# Patient Record
Sex: FEMALE | Race: OTHER | HISPANIC OR LATINO | Employment: UNEMPLOYED | URBAN - METROPOLITAN AREA
[De-identification: names, ages, dates, MRNs, and addresses within clinical notes are randomized per-mention and may not be internally consistent; named-entity substitution may affect disease eponyms.]

---

## 2018-02-19 ENCOUNTER — HOSPITAL ENCOUNTER (EMERGENCY)
Facility: HOSPITAL | Age: 53
Discharge: HOME | End: 2018-02-19
Payer: COMMERCIAL

## 2018-02-19 VITALS
HEART RATE: 112 BPM | TEMPERATURE: 98.7 F | OXYGEN SATURATION: 98 % | SYSTOLIC BLOOD PRESSURE: 159 MMHG | DIASTOLIC BLOOD PRESSURE: 76 MMHG | RESPIRATION RATE: 18 BRPM | WEIGHT: 248 LBS

## 2018-02-19 DIAGNOSIS — B34.9 ACUTE VIRAL SYNDROME: Primary | ICD-10-CM

## 2018-02-19 PROCEDURE — 99283 EMERGENCY DEPT VISIT LOW MDM: CPT

## 2018-02-19 NOTE — DISCHARGE INSTRUCTIONS
Síndrome viral   LO QUE NECESITA SABER:   El síndrome viral es un término general usado para describir mushtaq infección viral que no tiene mushtaq causa definida  Los virus son propagados fácilmente de Ismael Roberto persona a otra a través del aire y [place] los objetos que se comparten  INSTRUCCIONES SOBRE [facility]:   Llame al 911 en mireille de presentar lo siguiente:   · Usted sufre mushtaq convulsión  · No es posible despertarlo  · Usted tiene dolor torácico y dificultad para respirar  Regrese a la mary de emergencias si:   · Usted tiene rigidez en el collin, dolor de ofelia intenso y sensibilidad a la emilee  · Usted se siente débil, mareado o confundido  · Usted demond de orinar u orina menos de lo normal      · Usted tose emmy o mushtaq mucosidad espesa amarilla o ta  · Usted tiene dolor abdominal severo o horne abdomen está más jaguar de lo habitual   Pregúntele a horne médico qué vitaminas y minerales son adecuados para usted  · Suzy síntomas no mejoran con el tratamiento o empeoran después de 3 días  · Usted tiene salpullido o dolor de oído  · Usted siente ardor al [place]  · Usted tiene preguntas o inquietudes acerca de horne condición o cuidado  Medicamentos:  Es posible que usted necesite alguno de los siguientes:  · El acetaminofén  armaan el dolor y baja la fiebre  Está disponible sin receta médica  Pregunte cuánto medicamento debe zen y con qué frecuencia  [id number]  El acetaminofén puede causar daño en el hígado cuando no se sae de forma correcta  · AINEs (Analgésicos antiinflamatorios no esteroides) keith el ibuprofeno, ayudan a disminuir la inflamación, el dolor y la [place]  Los AINEs pueden causar sangrado estomacal o problemas renales en ciertas personas  Si usted sae un medicamento anticoagulante, siempre pregúntele a horne médico si los HARSH son seguros para usted  Siempre sachi la etiqueta de gualberto medicamento y [place] instrucciones      · El medicamento para el resfriado  ayuda a disminuir la inflamación, a controlar la tos y a aliviar la congestión del pecho o nasal      · El rociador nasal de agua salina  ayuda a disminuir la congestión nasal      · Josephine alanna medicamentos keith se le haya indicado  Consulte con horne médico si usted parker que horne medicamento no le está ayudando o si presenta efectos secundarios  Infórmele si es alérgico a algún medicamento  Mantenga mushtaq lista actualizada de los [place], las vitaminas y los productos herbales que sae  Incluya los siguientes datos de los medicamentos: cantidad, frecuencia y motivo de administración  Traiga con usted la lista o los envases de la píldoras a alanna citas de seguimiento  Lleve la lista de los medicamentos con usted en mireille de mushtaq emergencia  El manejo de horne síntomas:   · Consuma líquidos según le indicaron  para evitar la deshidratación  Pregunte cuánto líquido debe zen cada día y cuáles líquidos son los más adecuados para usted  Pregunte si usted debería zen mushtaq solución de rehidratación oral (SRO)  [address] cantidades exactas de agua, sal y azúcar que usted necesita para reemplazar los líquidos corporales  [place] podría ayudarlo a evitar la deshidratación a causa del vómito y de la diarrea  No tome líquidos con cafeína  Los líquidos con cafeína pueden empeorar la deshidratación  · Descanse lo suficiente  para ayudar a que horne cuerpo sane  Josephine siestas karen el día  Pregunte a horne médico cuándo puede regresar a horne trabajo y a alanna actividades cotidianas  · Use un humidificador de laurita frío  para ayudarlo a respirar más fácilmente si usted tiene congestión nasal o del pecho  Pregunte a horne médico cómo usar un humidificador de vapor frío  · Coma miel o use caramelos para la tos  para ayudar a disminuir la molestia de la garganta  Pregunte a horne médico cuánta miel debería comer cada día  Los caramelos para la tos están disponibles sin receta médica   Siga las indicaciones para zen los caramelos para la tos  · No fume y permanezca lejos de otras personas que fuman  La nicotina y otras sustancias químicas que contienen los cigarrillos y cigarros pueden dañar los pulmones  El fumar también puede retrasar la sanación  Pida información a quiles médico si usted actualmente fuma y necesita ayuda para dejar de fumar  Los cigarrillos electrónicos o tabaco sin humo todavía contienen nicotina  Consulte con quiles médico antes de [employer]  · Lávese las cherelle frecuentemente  para evitar la propagación de gérmenes a otras personas  Utilice agua y Jovani  Use gel antibacterial cuando no tenga jabón ni agua disponibles  [employer] las cherelle después de usar el baño, toser o estornudar  Lávese las cherelle antes de comer o preparar alimentos  Acuda a alanna consultas de control con quiles médico según le indicaron  Anote alanna preguntas para que se acuerde de hacerlas karen alanna visitas  © 2017 [address]  Information is for End User's use only and may not be sold, redistributed or otherwise used for commercial purposes  All illustrations and images included in CareNotes® are the copyrighted property of A D A M , Inc  or Jonatan Frey  Esta información es sólo para uso en educación  Quiles intención no es darle un consejo médico sobre enfermedades o tratamientos  Colsulte con quiles Harjinder Handy farmacéutico antes de seguir cualquier régimen médico para saber si es seguro y efectivo para usted

## 2018-02-19 NOTE — ED PROVIDER NOTES
History  Chief Complaint   Patient presents with    Flu Symptoms     c/o chills, subjective feves  sore throat and cough, that started last wednesday  pt came from [place] on tuesday   +sick contacts  47year old female PMH asthma, HTN, DM, psychiatric illness presents today complaining of nonproductive cough, congestion, sore throat, subjective fever, body aches which started a few days ago  Patient recently traveled to the [address]  from [facility]   Denies chest pain, shortness of breath, leg swelling, abdominal pain, nausea, vomiting, diarrhea, dysuria  She has been tolerating p  o   Has been taking Tylenol and an over-the-counter cough medication which has been helping with her symptoms  Her family is here today with same symptoms  None       Past Medical History:   Diagnosis Date    Asthma     Diabetes mellitus ([address] )     GERD (gastroesophageal reflux disease)     Hyperlipidemia     Hypertension     Psychiatric disorder     schizophrenia        Past Surgical History:   Procedure Laterality Date    THROAT SURGERY      8 years ago        History reviewed  No pertinent family history  I have reviewed and agree with the history as documented  Social History   Substance Use Topics    Smoking status: Current Every Day Smoker     Packs/day: 0 20    Smokeless tobacco: Never Used    Alcohol use No        Review of Systems   Constitutional: Positive for chills  HENT: Positive for congestion and sore throat  Respiratory: Positive for cough  Musculoskeletal: Positive for myalgias  All other systems reviewed and are negative        Physical Exam  ED Triage Vitals [02/19/18 1604]   Temperature Pulse Respirations Blood Pressure SpO2   98 7 °F (37 1 °C) (!) 112 18 159/76 98 %      Temp Source Heart Rate Source Patient Position - Orthostatic VS BP Location FiO2 (%)   Oral Monitor Sitting Right arm --      Pain Score       9           Orthostatic Vital Signs  Vitals:    02/19/18 1604 BP: 159/76   Pulse: (!) 112   Patient Position - Orthostatic VS: Sitting       Physical Exam   Constitutional: She appears well-developed and well-nourished  No distress  HENT:   Head: Normocephalic and atraumatic  Mouth/Throat: Oropharynx is clear and moist  No oropharyngeal exudate  Eyes: Conjunctivae and EOM are normal  Pupils are equal, round, and reactive to light  Neck: Normal range of motion  Cardiovascular: Normal rate and regular rhythm  Pulmonary/Chest: Effort normal and breath sounds normal  No respiratory distress  She has no wheezes  Abdominal: Soft  She exhibits no distension  There is no tenderness  There is no guarding  Musculoskeletal: Normal range of motion  Lymphadenopathy:     She has no cervical adenopathy  Neurological: She is alert  Skin: Skin is warm and dry  Capillary refill takes less than 2 seconds  She is not diaphoretic  Psychiatric: She has a normal mood and affect  ED Medications  Medications - No data to display    Diagnostic Studies  Results Reviewed     None                 No orders to display              Procedures  Procedures       Phone Contacts  ED Phone Contact    ED Course  ED Course                                MDM  Number of Diagnoses or Management Options  Acute viral syndrome:   Diagnosis management comments: Pt with flu-like symptoms  Entire family sick with same  Recent travel, pt denies SOB, hemoptysis or history of DVT/PE  Out of time frame for Tamiflu  Will recommend supportive care and follow-up with PCP  Return precautions reviewed      CritCare Time    Disposition  Final diagnoses:   Acute viral syndrome     Time reflects when diagnosis was documented in both MDM as applicable and the Disposition within this note     Time User Action Codes Description Comment    2/19/2018  6:25 PM Moris Overton Add [B34 9] Acute viral syndrome       ED Disposition     ED Disposition Condition Comment    Discharge  [facility] discharge to home/self care  Condition at discharge: Good        Follow-up Information     Follow up With Specialties Details Why [address]  Schedule an appointment as soon as possible for a visit  [address] [facility] [address] [place] [address] [facility]          There are no discharge medications for this patient  No discharge procedures on file      ED Provider  Electronically Signed by           Viktoria Mccall PA-C  02/19/18 209 [facility] Hameed  02/19/18 8889

## 2018-04-02 ENCOUNTER — HOSPITAL ENCOUNTER (EMERGENCY)
Facility: HOSPITAL | Age: 53
Discharge: HOME | End: 2018-04-03
Attending: EMERGENCY MEDICINE | Admitting: EMERGENCY MEDICINE
Payer: COMMERCIAL

## 2018-04-02 DIAGNOSIS — K80.20 CHOLELITHIASIS: Primary | ICD-10-CM

## 2018-04-02 DIAGNOSIS — E27.8 ADRENAL NODULE (HCC): ICD-10-CM

## 2018-04-02 LAB
BASOPHILS # BLD AUTO: 0.05 THOUSANDS/ΜL (ref 0–0.1)
BASOPHILS NFR BLD AUTO: 0 % (ref 0–1)
EOSINOPHIL # BLD AUTO: 0.19 THOUSAND/ΜL (ref 0–0.61)
EOSINOPHIL NFR BLD AUTO: 2 % (ref 0–6)
ERYTHROCYTE [DISTWIDTH] IN BLOOD BY AUTOMATED COUNT: 12.5 % (ref 11.6–15.1)
HCT VFR BLD AUTO: 42 % (ref 34.8–46.1)
HGB BLD-MCNC: 14 G/DL (ref 11.5–15.4)
LYMPHOCYTES # BLD AUTO: 3.12 THOUSANDS/ΜL (ref 0.6–4.47)
LYMPHOCYTES NFR BLD AUTO: 26 % (ref 14–44)
MCH RBC QN AUTO: 30.7 PG (ref 26.8–34.3)
MCHC RBC AUTO-ENTMCNC: 33.3 G/DL (ref 31.4–37.4)
MCV RBC AUTO: 92 FL (ref 82–98)
MONOCYTES # BLD AUTO: 0.61 THOUSAND/ΜL (ref 0.17–1.22)
MONOCYTES NFR BLD AUTO: 5 % (ref 4–12)
NEUTROPHILS # BLD AUTO: 8.02 THOUSANDS/ΜL (ref 1.85–7.62)
NEUTS SEG NFR BLD AUTO: 67 % (ref 43–75)
NRBC BLD AUTO-RTO: 0 /100 WBCS
PLATELET # BLD AUTO: 316 THOUSANDS/UL (ref 149–390)
PMV BLD AUTO: 10.6 FL (ref 8.9–12.7)
RBC # BLD AUTO: 4.56 MILLION/UL (ref 3.81–5.12)
WBC # BLD AUTO: 11.99 THOUSAND/UL (ref 4.31–10.16)

## 2018-04-02 PROCEDURE — 80053 COMPREHEN METABOLIC PANEL: CPT | Performed by: EMERGENCY MEDICINE

## 2018-04-02 PROCEDURE — 81002 URINALYSIS NONAUTO W/O SCOPE: CPT | Performed by: EMERGENCY MEDICINE

## 2018-04-02 PROCEDURE — 83690 ASSAY OF LIPASE: CPT | Performed by: EMERGENCY MEDICINE

## 2018-04-02 PROCEDURE — 85025 COMPLETE CBC W/AUTO DIFF WBC: CPT | Performed by: EMERGENCY MEDICINE

## 2018-04-02 PROCEDURE — 36415 COLL VENOUS BLD VENIPUNCTURE: CPT | Performed by: EMERGENCY MEDICINE

## 2018-04-03 ENCOUNTER — APPOINTMENT (EMERGENCY)
Dept: CT IMAGING | Facility: HOSPITAL | Age: 53
End: 2018-04-03
Payer: COMMERCIAL

## 2018-04-03 VITALS
OXYGEN SATURATION: 97 % | SYSTOLIC BLOOD PRESSURE: 188 MMHG | HEART RATE: 90 BPM | TEMPERATURE: 97.8 F | RESPIRATION RATE: 20 BRPM | DIASTOLIC BLOOD PRESSURE: 88 MMHG

## 2018-04-03 LAB
ALBUMIN SERPL BCP-MCNC: 3.4 G/DL (ref 3.5–5)
ALP SERPL-CCNC: 136 U/L (ref 46–116)
ALT SERPL W P-5'-P-CCNC: 34 U/L (ref 12–78)
ANION GAP SERPL CALCULATED.3IONS-SCNC: 13 MMOL/L (ref 4–13)
AST SERPL W P-5'-P-CCNC: 20 U/L (ref 5–45)
BACTERIA UR QL AUTO: ABNORMAL /HPF
BILIRUB SERPL-MCNC: 0.22 MG/DL (ref 0.2–1)
BILIRUB UR QL STRIP: NEGATIVE
BUN SERPL-MCNC: 14 MG/DL (ref 5–25)
CALCIUM SERPL-MCNC: 9.1 MG/DL (ref 8.3–10.1)
CHLORIDE SERPL-SCNC: 100 MMOL/L (ref 100–108)
CLARITY UR: CLEAR
CO2 SERPL-SCNC: 26 MMOL/L (ref 21–32)
COLOR UR: YELLOW
CREAT SERPL-MCNC: 0.85 MG/DL (ref 0.6–1.3)
GFR SERPL CREATININE-BSD FRML MDRD: 79 ML/MIN/1.73SQ M
GLUCOSE SERPL-MCNC: 322 MG/DL (ref 65–140)
GLUCOSE UR STRIP-MCNC: ABNORMAL MG/DL
HGB UR QL STRIP.AUTO: NEGATIVE
KETONES UR STRIP-MCNC: ABNORMAL MG/DL
LEUKOCYTE ESTERASE UR QL STRIP: ABNORMAL
LIPASE SERPL-CCNC: 120 U/L (ref 73–393)
NITRITE UR QL STRIP: NEGATIVE
NON-SQ EPI CELLS URNS QL MICRO: ABNORMAL /HPF
PH UR STRIP.AUTO: 5.5 [PH] (ref 4.5–8)
POTASSIUM SERPL-SCNC: 3.9 MMOL/L (ref 3.5–5.3)
PROT SERPL-MCNC: 6.8 G/DL (ref 6.4–8.2)
PROT UR STRIP-MCNC: NEGATIVE MG/DL
RBC #/AREA URNS AUTO: ABNORMAL /HPF
SODIUM SERPL-SCNC: 139 MMOL/L (ref 136–145)
SP GR UR STRIP.AUTO: 1.02 (ref 1–1.03)
UROBILINOGEN UR QL STRIP.AUTO: 0.2 E.U./DL
WBC #/AREA URNS AUTO: ABNORMAL /HPF

## 2018-04-03 PROCEDURE — 96360 HYDRATION IV INFUSION INIT: CPT

## 2018-04-03 PROCEDURE — 99284 EMERGENCY DEPT VISIT MOD MDM: CPT

## 2018-04-03 PROCEDURE — 81001 URINALYSIS AUTO W/SCOPE: CPT

## 2018-04-03 PROCEDURE — 74177 CT ABD & PELVIS W/CONTRAST: CPT

## 2018-04-03 RX ORDER — NAPROXEN 500 MG/1
500 TABLET ORAL 2 TIMES DAILY WITH MEALS
Qty: 10 TABLET | Refills: 0 | Status: SHIPPED | OUTPATIENT
Start: 2018-04-03 | End: 2018-04-08

## 2018-04-03 RX ADMIN — SODIUM CHLORIDE 1000 ML: 0.9 INJECTION, SOLUTION INTRAVENOUS at 00:24

## 2018-04-03 RX ADMIN — IOHEXOL 100 ML: 350 INJECTION, SOLUTION INTRAVENOUS at 00:38

## 2018-04-03 NOTE — DISCHARGE INSTRUCTIONS
Gallstones   WHAT YOU NEED TO KNOW:   What are gallstones? Gallstones, also called cholelithiasis, are hard substances that form in your gallbladder or bile duct  Your gallbladder and bile duct are located on the right side of your abdomen, near your liver  Your gallbladder stores bile, which helps break down the fat that you eat  Your gallbladder also helps remove certain chemicals from your body  What causes gallstones? Gallstones develop when your gallbladder does not empty correctly  Stones can form from different bile materials  The following may increase your risk:  · Obesity    · Pregnancy    · Having a family member with gallstones    · Diabetes or previous surgery of the intestines    · Rapid weight loss    · Certain medicines, such as estrogen, antibiotics, and cholesterol-lowering medicines  What are the signs and symptoms of gallstones? The most common symptom is severe, constant pain in the right upper abdomen  It is usually just below the ribcage  The pain may also be felt in the right shoulder and between the shoulder blades  You may also have any of the following:  · Nausea and vomiting    · Feeling bloated    · Pale bowel movements    · Dark urine  How are gallstones diagnosed? · Blood tests  may show signs of infection or inflammation  · An abdominal ultrasound  uses sound waves to show pictures of your abdomen on a monitor  · A liver and gallbladder scan  may also be called a HIDA scan  You are given a small amount of radioactive dye in your IV and pictures are taken by a scanner  Your healthcare provider looks at the pictures to see if your liver and gallbladder are working normally  · An ERCP  is also called an endoscopic retrograde cholangiopancreatography  This test is done during an endoscopy to find stones, tumors, or other problems  Dye is put into the endoscopy tube  The dye helps your pancreas and bile ducts show up better on x-rays   Tell the healthcare provider if you have ever had an allergic reaction to contrast dye  If you have stones, they may be removed during ERCP  · Oral cholecystography  is a test to look at your gallbladder and its ducts (passages)  You will take pills that have a special dye in them  Then x-rays are taken over time  The dye makes your gallbladder and its ducts show up on the x-rays  This may make it easier for your healthcare provider to see any stones or swelling in your gallbladder  Tell the healthcare provider if you have ever had an allergic reaction to contrast dye  It is also very important to tell your healthcare provider if there is any chance you could be pregnant  Your healthcare provider will tell you what you can and cannot eat before the test  It is important to follow your healthcare provider's instructions or the test may not work  How are gallstones treated? · Antinausea medicine  may be given to calm your stomach and to help prevent vomiting  · Prescription pain medicine  may be given  Ask your healthcare provider how to take this medicine safely  · A cholecystectomy  is surgery to remove your gallbladder  When should I contact my healthcare provider? · You have nausea and are vomiting  · Your urine is dark  · You have debbie-colored bowel movements  · You have questions or concerns about your condition or care  When should I seek immediate care or call 911? · You have a fever and chills  · Your eyes or skin turn yellow  · You have severe pain in your upper abdomen, just below the right ribcage  CARE AGREEMENT:   You have the right to help plan your care  Learn about your health condition and how it may be treated  Discuss treatment options with your caregivers to decide what care you want to receive  You always have the right to refuse treatment  The above information is an  only  It is not intended as medical advice for individual conditions or treatments   Talk to your doctor, nurse or pharmacist before following any medical regimen to see if it is safe and effective for you  © 2017 [address] Information is for End User's use only and may not be sold, redistributed or otherwise used for commercial purposes  All illustrations and images included in CareNotes® are the copyrighted property of A D A M , Inc  or Jonatan Frey

## 2018-04-03 NOTE — ED PROVIDER NOTES
History  Chief Complaint   Patient presents with    Abdominal Pain     Patient reports right sided abdomnial pain which began yesterday, also reports diarrhea  Denies urinary symptoms  Also reports bright red blood in stool  History provided by:  Patient   used: No    Abdominal Pain   Pain location:  RUQ  Pain quality: cramping    Pain radiates to:  Does not radiate  Pain severity:  Moderate  Onset quality:  Gradual  Duration:  2 days  Timing:  Intermittent  Progression:  Waxing and waning  Chronicity:  Recurrent  Context: previous surgery (umbilical hernia) and sick contacts    Relieved by:  Nothing  Worsened by:  Nothing  Ineffective treatments:  None tried  Associated symptoms: hematochezia    Associated symptoms: no chest pain, no chills, no cough, no diarrhea, no dysuria, no fever, no nausea, no shortness of breath, no sore throat and no vomiting    Risk factors comment:  DM, HTN, Umbilical hernia surgery, gall stones      None       Past Medical History:   Diagnosis Date    Asthma     Diabetes mellitus ([facility] [address] )     GERD (gastroesophageal reflux disease)     Hyperlipidemia     Hypertension     Psychiatric disorder     schizophrenia        Past Surgical History:   Procedure Laterality Date    THROAT SURGERY      8 years ago        History reviewed  No pertinent family history  I have reviewed and agree with the history as documented  Social History   Substance Use Topics    Smoking status: Current Every Day Smoker     Packs/day: 0 20    Smokeless tobacco: Never Used    Alcohol use No        Review of Systems   Constitutional: Negative for activity change, chills and fever  HENT: Negative for facial swelling, sore throat and trouble swallowing  Eyes: Negative for pain and visual disturbance  Respiratory: Negative for cough, chest tightness and shortness of breath  Cardiovascular: Negative for chest pain and leg swelling     Gastrointestinal: Positive for abdominal pain and hematochezia  Negative for blood in stool, diarrhea, nausea and vomiting  Genitourinary: Negative for dysuria and flank pain  Musculoskeletal: Negative for back pain, neck pain and neck stiffness  Skin: Negative for pallor and rash  Allergic/Immunologic: Negative for environmental allergies and immunocompromised state  Neurological: Negative for dizziness and headaches  Hematological: Negative for adenopathy  Does not bruise/bleed easily  Psychiatric/Behavioral: Negative for agitation and behavioral problems  All other systems reviewed and are negative  Physical Exam  ED Triage Vitals [04/02/18 2159]   Temperature Pulse Respirations Blood Pressure SpO2   97 8 °F (36 6 °C) 102 20 (!) 190/83 99 %      Temp Source Heart Rate Source Patient Position - Orthostatic VS BP Location FiO2 (%)   Oral Monitor Sitting Right arm --      Pain Score       Worst Possible Pain           Orthostatic Vital Signs  Vitals:    04/02/18 2159 04/03/18 0025   BP: (!) 190/83 (!) 188/88   Pulse: 102 90   Patient Position - Orthostatic VS: Sitting        Physical Exam   Constitutional: She is oriented to person, place, and time  She appears well-developed and well-nourished  No distress  HENT:   Head: Normocephalic and atraumatic  Eyes: EOM are normal    Neck: Normal range of motion  Neck supple  Cardiovascular: Normal rate, regular rhythm, normal heart sounds and intact distal pulses  Pulmonary/Chest: Effort normal and breath sounds normal    Abdominal: Soft  Bowel sounds are normal  There is tenderness (right flank)  There is no rebound and no guarding  Musculoskeletal: Normal range of motion  Neurological: She is alert and oriented to person, place, and time  Skin: Skin is warm and dry  Psychiatric: She has a normal mood and affect  Nursing note and vitals reviewed        ED Medications  Medications   sodium chloride 0 9 % bolus 1,000 mL (0 mL Intravenous Stopped 4/3/18 2847)   iohexol (OMNIPAQUE) 350 MG/ML injection (SINGLE-DOSE) 100 mL (100 mL Intravenous Given 4/3/18 0038)       Diagnostic Studies  Results Reviewed     Procedure Component Value Units Date/Time    Urine Microscopic [[id number]]  (Abnormal) Collected:  04/03/18 0054    Lab Status:  Final result Specimen:  Urine from Urine, Clean Catch Updated:  04/03/18 0123     RBC, UA 1-2 (A) /hpf      WBC, UA 0-1 (A) /hpf      Epithelial Cells Moderate (A) /hpf      Bacteria, UA Occasional /hpf     POCT urinalysis dipstick [[id number]]  (Abnormal) Resulted:  04/03/18 0058    Lab Status:  Final result Specimen:  Urine Updated:  04/03/18 0058    ED Urine Macroscopic [[id number]]  (Abnormal) Collected:  04/03/18 0054    Lab Status:  Final result Specimen:  Urine Updated:  04/03/18 0054     Color, UA Yellow     Clarity, UA Clear     pH, UA 5 5     Leukocytes, UA Elevated glucose may cause decreased leukocyte values   See urine microscopic for [facility] result/ (A)     Nitrite, UA Negative     Protein, UA Negative mg/dl      Glucose, UA >=1000 (1%) (A) mg/dl      Ketones, UA Trace (A) mg/dl      Urobilinogen, UA 0 2 E U /dl      Bilirubin, UA Negative     Blood, UA Negative     Specific Gravity, UA 1 020    Narrative:       CLINITEK RESULT    Comprehensive metabolic panel [[id number]]  (Abnormal) Collected:  04/02/18 2346    Lab Status:  Final result Specimen:  Blood from Hand, Right Updated:  04/03/18 0012     Sodium 139 mmol/L      Potassium 3 9 mmol/L      Chloride 100 mmol/L      CO2 26 mmol/L      Anion Gap 13 mmol/L      BUN 14 mg/dL      Creatinine 0 85 mg/dL      Glucose 322 (H) mg/dL      Calcium 9 1 mg/dL      AST 20 U/L      ALT 34 U/L      Alkaline Phosphatase 136 (H) U/L      Total Protein 6 8 g/dL      Albumin 3 4 (L) g/dL      Total Bilirubin 0 22 mg/dL      eGFR 79 ml/min/1 73sq m     Narrative:         National Kidney Disease Education Program recommendations are as follows:  GFR calculation is accurate only with a steady state creatinine  Chronic Kidney disease less than 60 ml/min/1 73 sq  meters  Kidney failure less than 15 ml/min/1 73 sq  meters  Lipase [[id number]]  (Normal) Collected:  04/02/18 2346    Lab Status:  Final result Specimen:  Blood from Hand, Right Updated:  04/03/18 0012     Lipase 120 u/L     CBC and differential [[id number]]  (Abnormal) Collected:  04/02/18 2346    Lab Status:  Final result Specimen:  Blood from Hand, Right Updated:  04/02/18 2357     WBC 11 99 (H) Thousand/uL      RBC 4 56 Million/uL      Hemoglobin 14 0 g/dL      Hematocrit 42 0 %      MCV 92 fL      MCH 30 7 pg      MCHC 33 3 g/dL      RDW 12 5 %      MPV 10 6 fL      Platelets 338 Thousands/uL      nRBC 0 /100 WBCs      Neutrophils Relative 67 %      Lymphocytes Relative 26 %      Monocytes Relative 5 %      Eosinophils Relative 2 %      Basophils Relative 0 %      Neutrophils Absolute 8 02 (H) Thousands/µL      Lymphocytes Absolute 3 12 Thousands/µL      Monocytes Absolute 0 61 Thousand/µL      Eosinophils Absolute 0 19 Thousand/µL      Basophils Absolute 0 05 Thousands/µL                  CT abdomen pelvis with contrast   Final Result by Aurora Bhatia DO (04/03 0104)      1  Indeterminant left adrenal gland nodule for which further evaluation with dedicated CT scan or MRI adrenal gland protocol is recommended on a nonemergent basis  2   Cholelithiasis            Workstation performed: [id number]                    Procedures  Procedures       Phone Contacts  ED Phone Contact    ED Course  ED Course as of Apr 03 0313   Tue Apr 03, 2018   0119 Labs, CT results reviewed, adrenal nodule, cholelithiasis, without evidence of cholecystitis; patient informed about the results, need for outpatient follow up urine shows leukocytes, micro pending  0124 Epithelial Cells: (!) Moderate   0124 Urine shows mod Epith cells, WBC 0-1   WBC, UA: (!) 0-1                               MDM  Number of Diagnoses or Management Options  Adrenal nodule ([facility] [address] ): new and requires workup  Cholelithiasis: new and requires workup  Diagnosis management comments: Patient is a 61-year-old female, comes in with complaints of right upper quadrant pain, has been known to have gallstones, came from [place] last month, also complains of back pain, rectal bleeding yesterday; patient has been sick with viral type illness, other family members having similar sickness  On exam no acute distress, vital signs are stable, abdomen soft, tenderness noted in right upper quadrant, no Jang's sign no distension, bowel sounds present  Differential diagnosis:  Biliary colic, cholecystitis, gastritis, UTI, pyelonephritis, ureteric colic, bowel obstruction  Will check labs including CBC, CMP, lipase, urine, CT scan abdomen pelvis  Amount and/or Complexity of Data Reviewed  Clinical lab tests: reviewed and ordered  Tests in the radiology section of CPT®: ordered and reviewed  Tests in the medicine section of CPT®: ordered and reviewed  Independent visualization of images, tracings, or specimens: yes      CritCare Time    Disposition  Final diagnoses:   Cholelithiasis   Adrenal nodule ([address] )     Time reflects when diagnosis was documented in both MDM as applicable and the Disposition within this note     Time User Action Codes Description Comment    4/3/2018  1:21 AM Janessa Baum Add [K80 20] Cholelithiasis     4/3/2018  1:21 AM Janessa Baum Add [E27 9] Adrenal nodule [facility])       ED Disposition     ED Disposition Condition Comment    Discharge  [facility] discharge to home/self care  Condition at discharge: Stable        Follow-up Information     Follow up With Specialties Details Why [address], YOU WILL NEED FURTHER OUTPATIENT TESTING AND SPECAILSIT REFERRALS      R[place] MD General Surgery   [address] [place] [address]  [phone]          Discharge Medication List as of 4/3/2018  1:27 AM      START taking these medications    Details   naproxen (NAPROSYN) 500 mg tablet Take 1 tablet (500 mg total) by mouth 2 (two) times a day with meals for 5 days, Starting Tue 4/3/2018, Until Sun 4/8/2018, Print           No discharge procedures on file      ED Provider  Electronically Signed by           Tita Antunez MD  04/03/18 5284